# Patient Record
Sex: MALE | Race: WHITE | NOT HISPANIC OR LATINO | Employment: OTHER | URBAN - METROPOLITAN AREA
[De-identification: names, ages, dates, MRNs, and addresses within clinical notes are randomized per-mention and may not be internally consistent; named-entity substitution may affect disease eponyms.]

---

## 2018-08-11 ENCOUNTER — OFFICE VISIT (OUTPATIENT)
Dept: URGENT CARE | Facility: CLINIC | Age: 73
End: 2018-08-11
Payer: MEDICARE

## 2018-08-11 VITALS
RESPIRATION RATE: 16 BRPM | WEIGHT: 170 LBS | OXYGEN SATURATION: 98 % | HEART RATE: 66 BPM | BODY MASS INDEX: 24.34 KG/M2 | TEMPERATURE: 98 F | HEIGHT: 70 IN | DIASTOLIC BLOOD PRESSURE: 70 MMHG | SYSTOLIC BLOOD PRESSURE: 130 MMHG

## 2018-08-11 DIAGNOSIS — M79.671 PAIN OF RIGHT HEEL: Primary | ICD-10-CM

## 2018-08-11 PROCEDURE — 99203 OFFICE O/P NEW LOW 30 MIN: CPT | Mod: S$GLB,,, | Performed by: NURSE PRACTITIONER

## 2018-08-11 RX ORDER — NAPROXEN 500 MG/1
500 TABLET ORAL 2 TIMES DAILY WITH MEALS
Qty: 10 TABLET | Refills: 0 | Status: SHIPPED | OUTPATIENT
Start: 2018-08-11 | End: 2018-08-11 | Stop reason: SDUPTHER

## 2018-08-11 RX ORDER — EZETIMIBE 10 MG/1
10 TABLET ORAL DAILY
COMMUNITY

## 2018-08-11 RX ORDER — HYDROCHLOROTHIAZIDE 50 MG/1
50 TABLET ORAL DAILY
COMMUNITY
End: 2021-03-26 | Stop reason: SDUPTHER

## 2018-08-11 RX ORDER — LISINOPRIL 10 MG/1
10 TABLET ORAL DAILY
COMMUNITY
End: 2021-03-26 | Stop reason: SDUPTHER

## 2018-08-11 RX ORDER — NAPROXEN 500 MG/1
500 TABLET ORAL 2 TIMES DAILY WITH MEALS
Qty: 10 TABLET | Refills: 0 | Status: SHIPPED | OUTPATIENT
Start: 2018-08-11 | End: 2018-08-16

## 2018-08-11 RX ORDER — ZOLPIDEM TARTRATE 10 MG/1
5 TABLET ORAL NIGHTLY PRN
COMMUNITY
End: 2021-03-26 | Stop reason: ALTCHOICE

## 2018-08-11 RX ORDER — OMEPRAZOLE 20 MG/1
20 CAPSULE, DELAYED RELEASE ORAL DAILY
COMMUNITY
End: 2021-03-26 | Stop reason: ALTCHOICE

## 2018-08-11 NOTE — PATIENT INSTRUCTIONS
Orthopedic Follow up Discharge Instructions    If your condition worsens or fails to improve we recommend that you receive another evaluation at the ER immediately or contact your PCP to discuss your concerns or return here. You must understand that you've received an urgent care treatment only and that you may be released before all your medical problems are known or treated. You the patient will arrange for follouwp care as instructed.   If you were prescribed a narcotic or muscle relaxant do not drive or operate heavy machinery while taking these medications   Tylenol or ibuprofen can also be used as directed for pain unless you have an allergy to them or medical condition such as stomach ulcers, kidney or liver disease or blood thinners etc for which you should not be taking these type of medications.   If you were given a prescription NSAID here do not also take any over the counter NSAID like ibuprofen, aleve, advil, motrin etc   RICE which means rest, ice compression and elevation are helpful.   If you were given a splint wear it at all times.   If you were given crutches use them as we instructed. Do not rest your armpits on the foam pad or you risk compressing the nerves and the vessels there   Follow up with the orthopedist in 1 week if you continue with pain.   Sometimes it can take up to 1 week for stress fractures to show up on an X-ray, and may need reimaging or a CT or MRI of the affected area.      General Referral to [facility]  You were referred to [facility] to Establish Care.  Please call [phone] to schedule your appointment.    Please return here or go to the Emergency Department for any concerns or worsening of condition.  Please follow up with your primary care doctor or specialist in the next 48-72hrs as needed.    If you  smoke, please stop smoking.

## 2018-08-11 NOTE — PROGRESS NOTES
"Subjective:       Patient ID: Javier June is a 72 y.o. male.    Vitals:  height is 5' 9.5" (1.765 m) and weight is 77.1 kg (170 lb). His temperature is 97.7 °F (36.5 °C). His blood pressure is 130/70 and his pulse is 66. His respiration is 16 and oxygen saturation is 98%.     Chief Complaint: Heel Pain    Pt presents with right heel pain. He states pain noticed on yesterday morning. He states one small tender spot when he touches the heel. He doesn;t think he hit the heel on anything to have caused this. He has some trouble walking- limping. [place] pain level is 5/10.       Foot Injury    The incident occurred 2 days ago. The injury mechanism is unknown. The symptoms are aggravated by weight bearing.     Review of Systems   Constitution: Negative for chills and fever.   HENT: Negative for sore throat.    Eyes: Negative for blurred vision.   Cardiovascular: Negative for chest pain.   Respiratory: Negative for shortness of breath.    Skin: Negative for rash.   Musculoskeletal: Negative for back pain and joint pain.   Gastrointestinal: Negative for abdominal pain, diarrhea, nausea and vomiting.   Neurological: Negative for headaches.   Psychiatric/Behavioral: The patient is not nervous/anxious.        Objective:      Physical Exam   Constitutional: He is oriented to person, place, and time. Vital signs are normal. He appears well-developed and well-nourished. He is cooperative.  Non-toxic appearance. He does not have a sickly appearance. He does not appear ill. No distress.   HENT:   Head: Normocephalic and atraumatic.   Right Ear: Hearing, tympanic membrane, external ear and ear canal normal.   Left Ear: Hearing, tympanic membrane, external ear and ear canal normal.   Nose: Nose normal. No mucosal edema, rhinorrhea or nasal deformity. No epistaxis. Right sinus exhibits no maxillary sinus tenderness and no frontal sinus tenderness. Left sinus exhibits no maxillary sinus tenderness and no frontal sinus tenderness. "   Mouth/Throat: Uvula is midline, oropharynx is clear and moist and mucous membranes are normal. No trismus in the jaw. Normal dentition. No uvula swelling. No posterior oropharyngeal erythema.   Eyes: Conjunctivae, EOM and lids are normal. Pupils are equal, round, and reactive to light. Right eye exhibits no discharge. Left eye exhibits no discharge. No scleral icterus.   Sclera clear bilat   Neck: Trachea normal, normal range of motion, full passive range of motion without pain and phonation normal. Neck supple.   Cardiovascular: Normal rate, regular rhythm, S1 normal, S2 normal, normal heart sounds, intact distal pulses and normal pulses.    Pulmonary/Chest: Effort normal and breath sounds normal. No respiratory distress. He has no decreased breath sounds. He has no wheezes. He has no rhonchi. He has no rales.   Abdominal: Soft. Normal appearance and bowel sounds are normal. He exhibits no distension, no pulsatile midline mass and no mass. There is no tenderness.   Musculoskeletal: He exhibits no edema or deformity.        Right foot: There is tenderness and bony tenderness. There is normal range of motion, no swelling, normal capillary refill, no crepitus, no deformity and no laceration.        Feet:    Mild tenderness across the posterior point of the calcaneum bone.  There is no swelling, deformity or laceration noted.  See attached picture   Neurological: He is alert and oriented to person, place, and time. He exhibits normal muscle tone. Coordination normal.   Skin: Skin is warm, dry and intact. No abrasion, no laceration and no rash noted. He is not diaphoretic. No pallor.   Psychiatric: He has a normal mood and affect. His speech is normal and behavior is normal. Judgment and thought content normal. Cognition and memory are normal.   Nursing note and vitals reviewed.              Assessment:       1. Pain of right heel        Plan:       Patient declined foot xray on today stating he would either return  here or go to his PCP if no improvement in a couple of days.    Patient called back saying [facility] is closed. Asked us to change to [facility] on [address].  Order resent.     Pain of right heel  -     naproxen (NAPROSYN) 500 MG tablet; Take 1 tablet (500 mg total) by mouth 2 (two) times daily with meals. for 5 days  Dispense: 10 tablet; Refill: 0  -     Ambulatory referral to Internal Medicine      Patient Instructions                                Orthopedic Follow up Discharge Instructions    If your condition worsens or fails to improve we recommend that you receive another evaluation at the ER immediately or contact your PCP to discuss your concerns or return here. You must understand that you've received an urgent care treatment only and that you may be released before all your medical problems are known or treated. You the patient will arrange for follouwp care as instructed.   If you were prescribed a narcotic or muscle relaxant do not drive or operate heavy machinery while taking these medications   Tylenol or ibuprofen can also be used as directed for pain unless you have an allergy to them or medical condition such as stomach ulcers, kidney or liver disease or blood thinners etc for which you should not be taking these type of medications.   If you were given a prescription NSAID here do not also take any over the counter NSAID like ibuprofen, aleve, advil, motrin etc   RICE which means rest, ice compression and elevation are helpful.   If you were given a splint wear it at all times.   If you were given crutches use them as we instructed. Do not rest your armpits on the foam pad or you risk compressing the nerves and the vessels there   Follow up with the orthopedist in 1 week if you continue with pain.   Sometimes it can take up to 1 week for stress fractures to show up on an X-ray, and may need reimaging or a CT or MRI of the affected area.      General Referral to [facility]  You were referred to  [facility] to Establish Care.  Please call [phone] to schedule your appointment.    Please return here or go to the Emergency Department for any concerns or worsening of condition.  Please follow up with your primary care doctor or specialist in the next 48-72hrs as needed.    If you  smoke, please stop smoking.

## 2018-08-14 ENCOUNTER — TELEPHONE (OUTPATIENT)
Dept: URGENT CARE | Facility: CLINIC | Age: 73
End: 2018-08-14

## 2018-08-14 ENCOUNTER — CLINICAL SUPPORT (OUTPATIENT)
Dept: URGENT CARE | Facility: CLINIC | Age: 73
End: 2018-08-14
Payer: MEDICARE

## 2018-08-14 VITALS
RESPIRATION RATE: 15 BRPM | HEART RATE: 78 BPM | HEIGHT: 70 IN | OXYGEN SATURATION: 98 % | WEIGHT: 170 LBS | BODY MASS INDEX: 24.34 KG/M2 | SYSTOLIC BLOOD PRESSURE: 167 MMHG | DIASTOLIC BLOOD PRESSURE: 98 MMHG | TEMPERATURE: 99 F

## 2018-08-14 DIAGNOSIS — M77.51 BONE SPUR OF RIGHT FOOT: ICD-10-CM

## 2018-08-14 DIAGNOSIS — M79.671 RIGHT FOOT PAIN: Primary | ICD-10-CM

## 2018-08-14 PROCEDURE — 99213 OFFICE O/P EST LOW 20 MIN: CPT | Mod: S$GLB,,, | Performed by: NURSE PRACTITIONER

## 2018-08-14 RX ORDER — NAPROXEN 500 MG/1
500 TABLET ORAL 2 TIMES DAILY WITH MEALS
Qty: 20 TABLET | Refills: 0 | Status: SHIPPED | OUTPATIENT
Start: 2018-08-14 | End: 2018-08-24

## 2018-08-14 NOTE — PROGRESS NOTES
"Subjective:       Patient ID: Javier June is a 72 y.o. male.    Vitals:  height is 5' 9.5" (1.765 m) and weight is 77.1 kg (170 lb). His temperature is 99 °F (37.2 °C). His blood pressure is 167/98 (abnormal) and his pulse is 78. His respiration is 15 and oxygen saturation is 98%.     Chief Complaint: Foot Injury (right heel pain when bearing weight.)    Pt was seen Saturday for right heel pain, has been taking rx and has found improvement but still hurts, pt was offered xrays last visit but refused, pt comes back requesting xrays now. Denies trauma or injury, says it is pin point pain when he stands or walks.       Review of Systems   Constitution: Negative for chills and fever.   HENT: Negative for sore throat.    Eyes: Negative for blurred vision.   Cardiovascular: Negative for chest pain.   Respiratory: Negative for shortness of breath.    Skin: Negative for rash.   Musculoskeletal: Positive for joint pain. Negative for back pain and stiffness.   Gastrointestinal: Negative for abdominal pain, diarrhea, nausea and vomiting.   Neurological: Negative for headaches.   Psychiatric/Behavioral: The patient is not nervous/anxious.        Objective:      Physical Exam   Constitutional: He is oriented to person, place, and time. He appears well-developed and well-nourished. He is cooperative.  Non-toxic appearance. He does not appear ill. No distress.   HENT:   Head: Normocephalic and atraumatic.   Right Ear: Hearing, tympanic membrane, external ear and ear canal normal.   Left Ear: Hearing, tympanic membrane, external ear and ear canal normal.   Nose: Nose normal. No mucosal edema, rhinorrhea or nasal deformity. No epistaxis. Right sinus exhibits no maxillary sinus tenderness and no frontal sinus tenderness. Left sinus exhibits no maxillary sinus tenderness and no frontal sinus tenderness.   Mouth/Throat: Uvula is midline, oropharynx is clear and moist and mucous membranes are normal. No trismus in the jaw. Normal " dentition. No uvula swelling. No posterior oropharyngeal erythema.   Eyes: Conjunctivae and lids are normal. Right eye exhibits no discharge. Left eye exhibits no discharge. No scleral icterus.   Sclera clear bilat   Neck: Trachea normal, normal range of motion, full passive range of motion without pain and phonation normal. Neck supple.   Cardiovascular: Normal rate, regular rhythm, normal heart sounds, intact distal pulses and normal pulses.   Pulmonary/Chest: Effort normal and breath sounds normal. No respiratory distress.   Abdominal: Soft. Normal appearance and bowel sounds are normal. He exhibits no distension, no pulsatile midline mass and no mass. There is no tenderness.   Musculoskeletal: Normal range of motion. He exhibits no edema or deformity.        Right foot: There is tenderness and bony tenderness.        Feet:    Point tenderness to right heel   Neurological: He is alert and oriented to person, place, and time. He exhibits normal muscle tone. Coordination normal.   Skin: Skin is warm, dry and intact. He is not diaphoretic. No pallor.   Psychiatric: He has a normal mood and affect. His speech is normal and behavior is normal. Judgment and thought content normal. Cognition and memory are normal.   Nursing note and vitals reviewed.      Assessment:       1. Right foot pain        Plan:         Right foot pain  -     X-Ray Foot Complete Right; Future; Expected date: 08/14/2018

## 2018-08-14 NOTE — PATIENT INSTRUCTIONS

## 2018-08-20 ENCOUNTER — TELEPHONE (OUTPATIENT)
Dept: URGENT CARE | Facility: CLINIC | Age: 73
End: 2018-08-20

## 2018-08-21 NOTE — TELEPHONE ENCOUNTER
patient called back, says he is doing great and has a follow up appointment with his doctor later this week

## 2018-08-29 ENCOUNTER — OFFICE VISIT (OUTPATIENT)
Dept: URGENT CARE | Facility: CLINIC | Age: 73
End: 2018-08-29
Payer: MEDICARE

## 2018-08-29 VITALS
HEART RATE: 76 BPM | WEIGHT: 170 LBS | DIASTOLIC BLOOD PRESSURE: 82 MMHG | HEIGHT: 70 IN | SYSTOLIC BLOOD PRESSURE: 140 MMHG | TEMPERATURE: 98 F | RESPIRATION RATE: 18 BRPM | OXYGEN SATURATION: 98 % | BODY MASS INDEX: 24.34 KG/M2

## 2018-08-29 DIAGNOSIS — S80.212A ABRASION OF LEFT KNEE, INITIAL ENCOUNTER: ICD-10-CM

## 2018-08-29 DIAGNOSIS — S89.92XA LEFT KNEE INJURY, INITIAL ENCOUNTER: Primary | ICD-10-CM

## 2018-08-29 DIAGNOSIS — L08.9 LOCALIZED BACTERIAL SKIN INFECTION: ICD-10-CM

## 2018-08-29 DIAGNOSIS — B96.89 LOCALIZED BACTERIAL SKIN INFECTION: ICD-10-CM

## 2018-08-29 DIAGNOSIS — S80.02XA CONTUSION OF LEFT KNEE, INITIAL ENCOUNTER: ICD-10-CM

## 2018-08-29 PROCEDURE — 99214 OFFICE O/P EST MOD 30 MIN: CPT | Mod: S$GLB,,, | Performed by: NURSE PRACTITIONER

## 2018-08-29 RX ORDER — CEPHALEXIN 500 MG/1
500 TABLET ORAL 3 TIMES DAILY
Qty: 30 TABLET | Refills: 0 | Status: SHIPPED | OUTPATIENT
Start: 2018-08-29 | End: 2018-09-08

## 2018-08-29 RX ORDER — NAPROXEN 500 MG/1
500 TABLET ORAL 2 TIMES DAILY WITH MEALS
Qty: 10 TABLET | Refills: 0 | Status: SHIPPED | OUTPATIENT
Start: 2018-08-29 | End: 2018-09-03

## 2018-08-29 RX ORDER — CEPHALEXIN 500 MG/1
500 TABLET ORAL 3 TIMES DAILY
Qty: 15 TABLET | Refills: 0 | Status: SHIPPED | OUTPATIENT
Start: 2018-08-29 | End: 2018-08-29

## 2018-08-29 RX ORDER — MUPIROCIN 20 MG/G
OINTMENT TOPICAL
Status: COMPLETED | OUTPATIENT
Start: 2018-08-29 | End: 2018-08-29

## 2018-08-29 RX ORDER — MUPIROCIN 20 MG/G
OINTMENT TOPICAL
Qty: 22 G | Refills: 1 | Status: SHIPPED | OUTPATIENT
Start: 2018-08-29 | End: 2021-03-26 | Stop reason: ALTCHOICE

## 2018-08-29 RX ADMIN — MUPIROCIN: 20 OINTMENT TOPICAL at 09:08

## 2018-08-29 NOTE — PROGRESS NOTES
"Subjective:       Patient ID: Javier June is a 72 y.o. male.    Vitals:  height is 5' 9.5" (1.765 m) and weight is 77.1 kg (170 lb). His temperature is 97.6 °F (36.4 °C). His blood pressure is 140/82 (abnormal) and his pulse is 76. His respiration is 18 and oxygen saturation is 98%.     Chief Complaint: Abrasion    Patient in for left knee pain and abrasion. Patient fell on the 20th while he was taking naproxen for his initial foot pain and after he ran out the swelling started again. Patient said he doesn't have any pain in the knee except when he stands up. Does not hurt to walk or put pressure on the leg. Patient has been putting neosporin on the knee. Not sure if his tetanus is utd. Patient said his foot feels better since the last time he was here. Patient is from here. Patient having some redness and swelling. The wound is warm to touch.       Knee Pain    The incident occurred more than 1 week ago. The pain is present in the left knee. Pertinent negatives include no inability to bear weight, numbness or tingling.     Review of Systems   Constitution: Negative for chills and fever.   HENT: Negative for sore throat.    Eyes: Negative for blurred vision.   Cardiovascular: Negative for chest pain.   Respiratory: Negative for shortness of breath.    Skin: Negative for rash.   Musculoskeletal: Negative for back pain and joint pain.   Gastrointestinal: Negative for abdominal pain, diarrhea, nausea and vomiting.   Neurological: Negative for headaches, numbness and tingling.   Psychiatric/Behavioral: The patient is not nervous/anxious.        Objective:      Physical Exam   Constitutional: He is oriented to person, place, and time. Vital signs are normal. He appears well-developed and well-nourished.  Non-toxic appearance. He does not have a sickly appearance. He does not appear ill. No distress.   HENT:   Head: Normocephalic and atraumatic. Head is without abrasion, without contusion and without laceration.   Right " Ear: External ear normal.   Left Ear: External ear normal.   Nose: Nose normal.   Mouth/Throat: Oropharynx is clear and moist.   Eyes: Conjunctivae, EOM and lids are normal. Pupils are equal, round, and reactive to light.   Neck: Trachea normal, full passive range of motion without pain and phonation normal. Neck supple.   Cardiovascular: Normal rate, regular rhythm, S1 normal, S2 normal, normal heart sounds and normal pulses.   Pulmonary/Chest: Effort normal and breath sounds normal. No stridor. No respiratory distress. He has no decreased breath sounds. He has no wheezes. He has no rhonchi. He has no rales.   Musculoskeletal: Normal range of motion.        Left knee: He exhibits swelling. He exhibits normal range of motion, no effusion, no ecchymosis, no deformity, no laceration, no erythema, normal alignment, no LCL laxity, normal patellar mobility, no bony tenderness, normal meniscus and no MCL laxity. Tenderness (around abrasion site.) found. No medial joint line, no lateral joint line, no MCL, no LCL and no patellar tendon tenderness noted.   Neurological: He is alert and oriented to person, place, and time.   Skin: Skin is warm and dry. Capillary refill takes less than 2 seconds. Abrasion noted. No bruising, no burn, no ecchymosis, no laceration, no lesion and no rash noted. No erythema.        Left knee healed abrasion with honey crusted covering and mild eschar to the top border of abrasion. There is also  redness and swelling surrounding the abrasion.  See attached picture   Psychiatric: He has a normal mood and affect. His speech is normal and behavior is normal. Judgment and thought content normal. Cognition and memory are normal.   Nursing note and vitals reviewed.          Type of Interpretation: Radiology Verbal Report.  Radiology Procedure Done: Left Knee.  Interpretation: XR KNEE 3 VIEW LEFT   Status: Final result  [facility] Results Release     [facility] Status: Declined   PACS Images      Show images  for XR KNEE 3 VIEW LEFT  XR KNEE 3 VIEW LEFT   Order: [id number]   Status:  Final result   Visible to patient:  No (Not Released) Next appt:  None Dx:  Contusion of left knee, initial encou...   Details       Reading Physician Reading Date Result Priority  Dre Lawler MD 8/29/2018     Narrative    EXAMINATION:  XR KNEE 3 VIEW LEFT    CLINICAL HISTORY:  Contusion of left knee, initial encounter    TECHNIQUE:  AP, lateral, and Merchant views of the left knee were performed.    COMPARISON:  None    FINDINGS:  Prepatellar soft tissue swelling tiny remote calcification medial femoral epiphysis, mild tricompartment DJD change.  Tiny remote calcification superior margin patella at extensor tendon insertion site.    Impression      Prepatellar soft tissue contusion.  No fracture, dislocation or joint effusion.      Electronically signed by: Dre Lawler MD  Date: 08/29/2018  Time: 09:43              Assessment:       1. Left knee injury, initial encounter    2. Abrasion of left knee, initial encounter    3. Localized bacterial skin infection    4. Contusion of left knee, initial encounter        Plan:         - Ace wrap to the left knee.    Left knee injury, initial encounter  -     XR KNEE 3 VIEW LEFT; Future; Expected date: 08/29/2018    Abrasion of left knee, initial encounter  -     mupirocin (BACTROBAN) 2 % ointment; Apply to affected area 3 times daily  Dispense: 22 g; Refill: 1  -     mupirocin 2 % ointment; Apply topically one time.    Localized bacterial skin infection  -     cephalexin (KEFTAB) 500 mg tablet; Take 1 tablet (500 mg total) by mouth 3 (three) times daily. for 10 days  Dispense: 15 tablet; Refill: 0    Contusion of left knee, initial encounter  -     naproxen (NAPROSYN) 500 MG tablet; Take 1 tablet (500 mg total) by mouth 2 (two) times daily with meals. for 5 days  Dispense: 10 tablet; Refill: 0          Patient Instructions                                Orthopedic Follow up Discharge  Instructions    If your condition worsens or fails to improve we recommend that you receive another evaluation at the ER immediately or contact your PCP to discuss your concerns or return here. You must understand that you've received an urgent care treatment only and that you may be released before all your medical problems are known or treated. You the patient will arrange for follouwp care as instructed.   If you were prescribed a narcotic or muscle relaxant do not drive or operate heavy machinery while taking these medications   Tylenol or ibuprofen can also be used as directed for pain unless you have an allergy to them or medical condition such as stomach ulcers, kidney or liver disease or blood thinners etc for which you should not be taking these type of medications.   If you were given a prescription NSAID here do not also take any over the counter NSAID like ibuprofen, aleve, advil, motrin etc   RICE which means rest, ice compression and elevation are helpful.   If you have Low Back Pain and develop bowel or bladder symptoms or increase pain going down your legs go to the ER immediately.   If you were given a splint wear it at all times.   If you were given crutches use them as we instructed. Do not rest your armpits on the foam pad or you risk compressing the nerves and the vessels there   Follow up with the orthopedist in 1 week if you continue with pain.   Sometimes it can take up to 1 week for stress fractures to show up on an X-ray, and may need reimaging or a CT or MRI of the affected area.      Contusions (bruising)  -  Cool compresses to the affected area 2-3 times a day for the first 48-72hrs.  -  Follow up with your PCP in the next 48-72hrs if no improvement in symptoms.  -  Follow up in the ER for any of the following symptoms:  · Pain, bruising, or swelling worsens  · Weakness, numbness or inability to bear weight in the extremity      Skin Infection  If your condition worsens or fails to  improve we recommend that you receive another evaluation at the ER immediately or contact your PCP to discuss your concerns or return here. You must understand that you've received an urgent care treatment only and that you may be released before all your medical problems are known or treated. You the patient will arrange for follouwp care as instructed.   Cool compressed to affected areas at least 2-3 times a day.  Avoid picking or manipulating the wound. Clean the wound twice a day and put the antibiotic ointment on it.   You should seek ER treatment if fever, increase pain, swelling, red streaks from affected area or other signs of increasing infection.   If you were prescribed antibiotics, please take them to completion  Tylenol or ibuprofen can also be used as directed for pain unless you have an allergy to them or medical condition such as stomach ulcers, kidney or liver disease or blood thinners etc for which you should not be taking these type of medications.   If not allergic, please take over the counter Tylenol (Acetaminophen) and/or Motrin (Ibuprofen) as directed for control of pain and/or fever.

## 2018-08-29 NOTE — PATIENT INSTRUCTIONS
Orthopedic Follow up Discharge Instructions    If your condition worsens or fails to improve we recommend that you receive another evaluation at the ER immediately or contact your PCP to discuss your concerns or return here. You must understand that you've received an urgent care treatment only and that you may be released before all your medical problems are known or treated. You the patient will arrange for follouwp care as instructed.   If you were prescribed a narcotic or muscle relaxant do not drive or operate heavy machinery while taking these medications   Tylenol or ibuprofen can also be used as directed for pain unless you have an allergy to them or medical condition such as stomach ulcers, kidney or liver disease or blood thinners etc for which you should not be taking these type of medications.   If you were given a prescription NSAID here do not also take any over the counter NSAID like ibuprofen, aleve, advil, motrin etc   RICE which means rest, ice compression and elevation are helpful.   If you have Low Back Pain and develop bowel or bladder symptoms or increase pain going down your legs go to the ER immediately.   If you were given a splint wear it at all times.   If you were given crutches use them as we instructed. Do not rest your armpits on the foam pad or you risk compressing the nerves and the vessels there   Follow up with the orthopedist in 1 week if you continue with pain.   Sometimes it can take up to 1 week for stress fractures to show up on an X-ray, and may need reimaging or a CT or MRI of the affected area.      Contusions (bruising)  -  Cool compresses to the affected area 2-3 times a day for the first 48-72hrs.  -  Follow up with your PCP in the next 48-72hrs if no improvement in symptoms.  -  Follow up in the ER for any of the following symptoms:  · Pain, bruising, or swelling worsens  · Weakness, numbness or inability to bear weight in the  extremity      Skin Infection  If your condition worsens or fails to improve we recommend that you receive another evaluation at the ER immediately or contact your PCP to discuss your concerns or return here. You must understand that you've received an urgent care treatment only and that you may be released before all your medical problems are known or treated. You the patient will arrange for follouwp care as instructed.   Cool compressed to affected areas at least 2-3 times a day.  Avoid picking or manipulating the wound. Clean the wound twice a day and put the antibiotic ointment on it.   You should seek ER treatment if fever, increase pain, swelling, red streaks from affected area or other signs of increasing infection.   If you were prescribed antibiotics, please take them to completion  Tylenol or ibuprofen can also be used as directed for pain unless you have an allergy to them or medical condition such as stomach ulcers, kidney or liver disease or blood thinners etc for which you should not be taking these type of medications.   If not allergic, please take over the counter Tylenol (Acetaminophen) and/or Motrin (Ibuprofen) as directed for control of pain and/or fever.

## 2018-08-31 ENCOUNTER — TELEPHONE (OUTPATIENT)
Dept: URGENT CARE | Facility: CLINIC | Age: 73
End: 2018-08-31

## 2018-12-12 ENCOUNTER — OFFICE VISIT (OUTPATIENT)
Dept: URGENT CARE | Facility: CLINIC | Age: 73
End: 2018-12-12
Payer: MEDICARE

## 2018-12-12 VITALS
RESPIRATION RATE: 18 BRPM | TEMPERATURE: 98 F | SYSTOLIC BLOOD PRESSURE: 176 MMHG | DIASTOLIC BLOOD PRESSURE: 92 MMHG | HEART RATE: 91 BPM | OXYGEN SATURATION: 98 %

## 2018-12-12 DIAGNOSIS — S81.012A LACERATION OF LEFT KNEE, INITIAL ENCOUNTER: ICD-10-CM

## 2018-12-12 DIAGNOSIS — S60.222A CONTUSION OF LEFT HAND, INITIAL ENCOUNTER: ICD-10-CM
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
## 2018-12-12 DIAGNOSIS — S80.02XA CONTUSION OF LEFT KNEE, INITIAL ENCOUNTER: ICD-10-CM

## 2018-12-12 PROCEDURE — 12031 INTMD RPR S/A/T/EXT 2.5 CM/<: CPT | Mod: S$GLB,,, | Performed by: NURSE PRACTITIONER

## 2018-12-12 PROCEDURE — 99214 OFFICE O/P EST MOD 30 MIN: CPT | Mod: 25,S$GLB,, | Performed by: NURSE PRACTITIONER

## 2018-12-12 PROCEDURE — 73562 X-RAY EXAM OF KNEE 3: CPT | Mod: FY,LT,S$GLB, | Performed by: RADIOLOGY

## 2018-12-12 PROCEDURE — 73130 X-RAY EXAM OF HAND: CPT | Mod: FY,LT,S$GLB, | Performed by: RADIOLOGY

## 2018-12-12 RX ORDER — MUPIROCIN 20 MG/G
OINTMENT TOPICAL
Qty: 22 G | Refills: 1 | Status: SHIPPED | OUTPATIENT
Start: 2018-12-12 | End: 2021-03-26 | Stop reason: ALTCHOICE

## 2018-12-12 RX ORDER — NAPROXEN 500 MG/1
500 TABLET ORAL 2 TIMES DAILY WITH MEALS
Qty: 20 TABLET | Refills: 0 | Status: SHIPPED | OUTPATIENT
Start: 2018-12-12 | End: 2018-12-22

## 2018-12-12 RX ORDER — DOXYCYCLINE 100 MG/1
100 CAPSULE ORAL 2 TIMES DAILY
Qty: 10 CAPSULE | Refills: 0 | Status: SHIPPED | OUTPATIENT
Start: 2018-12-12 | End: 2018-12-17

## 2018-12-12 RX ORDER — HYDROCODONE BITARTRATE AND ACETAMINOPHEN 5; 325 MG/1; MG/1
1 TABLET ORAL EVERY 6 HOURS PRN
Qty: 10 TABLET | Refills: 0 | Status: SHIPPED | OUTPATIENT
Start: 2018-12-12 | End: 2021-03-26 | Stop reason: ALTCHOICE

## 2018-12-12 RX ORDER — MUPIROCIN 20 MG/G
OINTMENT TOPICAL
Status: COMPLETED | OUTPATIENT
Start: 2018-12-12 | End: 2018-12-12

## 2018-12-12 RX ADMIN — MUPIROCIN: 20 OINTMENT TOPICAL at 03:12

## 2018-12-12 NOTE — PROCEDURES
Laceration Repair  Date/Time: 2018 3:18 PM  Performed by: Izaiah Cutler NP  Authorized by: Izaiah Cutler NP   Consent Done: Yes  Consent: Verbal consent obtained.  Risks and benefits: risks, benefits and alternatives were discussed  Consent given by: patient  Patient identity confirmed:  and name  Body area: lower extremity  Location details: left knee  Laceration length: 2 cm  Foreign bodies: no foreign bodies  Tendon involvement: none  Nerve involvement: none  Vascular damage: no  Patient sedated: no  Preparation: Patient was prepped and draped in the usual sterile fashion.  Irrigation solution: saline  Irrigation method: syringe  Amount of cleaning: extensive  Debridement: moderate  Degree of undermining: none  Dressing: antibiotic ointment, petrolatum gauze, tube gauze and Xeroform gauze  Patient tolerance: Patient tolerated the procedure well with no immediate complications

## 2018-12-12 NOTE — PROGRESS NOTES
Subjective:       Patient ID: Javier June is a 73 y.o. male.    Vitals:  temperature is 98.1 °F (36.7 °C). His blood pressure is 176/92 (abnormal) and his pulse is 91. His respiration is 18 and oxygen saturation is 98%.     Chief Complaint: Knee Pain (left) and Hand Pain (left thumb)    Pt is local, about 12:30pm today he tripped and fell on the sidewalk injurying both hands and left knee. Pt concerned because he hurt this same knee last visit. Pt is utd on tetanus and all other vaccines.       Knee Pain    The incident occurred 1 to 3 hours ago. The incident occurred in the street. The injury mechanism was a fall. The pain is present in the left knee. The quality of the pain is described as shooting, aching and burning. The pain is at a severity of 4/10. The pain is mild. The pain has been constant since onset. Pertinent negatives include no inability to bear weight, numbness or tingling. The symptoms are aggravated by palpation. He has tried nothing for the symptoms.   Hand Pain    The incident occurred 1 to 3 hours ago. The incident occurred in the street. The injury mechanism was a fall. The pain is present in the right fingers and left fingers. The quality of the pain is described as aching. Pertinent negatives include no numbness or tingling.       Constitution: Negative for fatigue.   HENT: Negative for facial swelling and facial trauma.    Neck: Negative for neck stiffness.   Cardiovascular: Negative for chest trauma.   Eyes: Negative for eye trauma, double vision and blurred vision.   Gastrointestinal: Negative for abdominal trauma, abdominal pain and rectal bleeding.   Genitourinary: Negative for hematuria, genital trauma and pelvic pain.   Musculoskeletal: Positive for pain, trauma, joint swelling and pain with walking. Negative for abnormal ROM of joint.   Skin: Positive for wound, abrasion and bruising. Negative for color change and laceration.   Neurological: Negative for dizziness, history of vertigo,  light-headedness, coordination disturbances, altered mental status, loss of consciousness and numbness.   Hematologic/Lymphatic: Negative for history of bleeding disorder.   Psychiatric/Behavioral: Negative for altered mental status.       Objective:      Physical Exam   Constitutional: He is oriented to person, place, and time. He appears well-developed and well-nourished. He is cooperative.  Non-toxic appearance. He does not appear ill. No distress.   HENT:   Head: Normocephalic and atraumatic. Head is without abrasion, without contusion and without laceration.   Right Ear: Hearing, tympanic membrane, external ear and ear canal normal. No hemotympanum.   Left Ear: Hearing, tympanic membrane, external ear and ear canal normal. No hemotympanum.   Nose: Nose normal. No mucosal edema, rhinorrhea or nasal deformity. No epistaxis. Right sinus exhibits no maxillary sinus tenderness and no frontal sinus tenderness. Left sinus exhibits no maxillary sinus tenderness and no frontal sinus tenderness.   Mouth/Throat: Uvula is midline, oropharynx is clear and moist and mucous membranes are normal. No trismus in the jaw. Normal dentition. No uvula swelling. No posterior oropharyngeal erythema.   Eyes: Conjunctivae, EOM and lids are normal. Pupils are equal, round, and reactive to light. Right eye exhibits no discharge. Left eye exhibits no discharge. No scleral icterus.   Sclera clear bilat   Neck: Trachea normal, normal range of motion, full passive range of motion without pain and phonation normal. Neck supple. No spinous process tenderness and no muscular tenderness present. No neck rigidity. No tracheal deviation present.   Cardiovascular: Normal rate, regular rhythm, normal heart sounds, intact distal pulses and normal pulses.   Pulmonary/Chest: Effort normal and breath sounds normal. No respiratory distress.   Abdominal: Soft. Normal appearance and bowel sounds are normal. He exhibits no distension, no pulsatile midline  mass and no mass. There is no tenderness.   Musculoskeletal: Normal range of motion. He exhibits no edema or deformity.        Left knee: He exhibits swelling, ecchymosis, laceration, erythema and bony tenderness. Tenderness found.        Left hand: He exhibits tenderness, bony tenderness and swelling.        Hands:       Legs:  Neurological: He is alert and oriented to person, place, and time. He has normal strength. No cranial nerve deficit or sensory deficit. He exhibits normal muscle tone. He displays no seizure activity. Coordination normal. GCS eye subscore is 4. GCS verbal subscore is 5. GCS motor subscore is 6.   Skin: Skin is warm, dry and intact. Capillary refill takes less than 2 seconds. No abrasion, no bruising, no burn, no ecchymosis and no laceration noted. He is not diaphoretic. No pallor.   Psychiatric: He has a normal mood and affect. His speech is normal and behavior is normal. Judgment and thought content normal. Cognition and memory are normal.   Nursing note and vitals reviewed.      Assessment:       1. Fall, initial encounter    2. Laceration of left knee, initial encounter    3. Contusion of left knee, initial encounter    4. Contusion of left hand, initial encounter        Plan:         Fall, initial encounter  -     XR KNEE 3 VIEW LEFT; Future; Expected date: 12/12/2018  -     XR HAND COMPLETE 3 VIEW LEFT; Future; Expected date: 12/12/2018  -     Ambulatory referral to Orthopedics  -     naproxen (NAPROSYN) 500 MG tablet; Take 1 tablet (500 mg total) by mouth 2 (two) times daily with meals. for 10 days  Dispense: 20 tablet; Refill: 0  -     HYDROcodone-acetaminophen (NORCO) 5-325 mg per tablet; Take 1 tablet by mouth every 6 (six) hours as needed for Pain.  Dispense: 10 tablet; Refill: 0  -     mupirocin 2 % ointment  -     mupirocin (BACTROBAN) 2 % ointment; Apply to affected area 2 times daily  Dispense: 22 g; Refill: 1  -     doxycycline (VIBRAMYCIN) 100 MG Cap; Take 1 capsule (100 mg  total) by mouth 2 (two) times daily. for 5 days  Dispense: 10 capsule; Refill: 0  -     Laceration Repair    Laceration of left knee, initial encounter  -     Laceration Repair    Contusion of left knee, initial encounter    Contusion of left hand, initial encounter

## 2020-01-15 ENCOUNTER — OFFICE VISIT (OUTPATIENT)
Dept: URGENT CARE | Facility: CLINIC | Age: 75
End: 2020-01-15
Payer: MEDICARE

## 2020-01-15 VITALS
DIASTOLIC BLOOD PRESSURE: 73 MMHG | HEART RATE: 93 BPM | OXYGEN SATURATION: 97 % | WEIGHT: 173 LBS | SYSTOLIC BLOOD PRESSURE: 137 MMHG | BODY MASS INDEX: 24.77 KG/M2 | RESPIRATION RATE: 17 BRPM | HEIGHT: 70 IN | TEMPERATURE: 99 F

## 2020-01-15 DIAGNOSIS — S01.302A OPEN WOUND OF LEFT EAR, UNSPECIFIED OPEN WOUND TYPE, INITIAL ENCOUNTER: ICD-10-CM

## 2020-01-15 DIAGNOSIS — L98.9 SKIN LESION: Primary | ICD-10-CM

## 2020-01-15 PROCEDURE — 99214 OFFICE O/P EST MOD 30 MIN: CPT | Mod: S$GLB,,, | Performed by: EMERGENCY MEDICINE

## 2020-01-15 PROCEDURE — 99214 PR OFFICE/OUTPT VISIT, EST, LEVL IV, 30-39 MIN: ICD-10-PCS | Mod: S$GLB,,, | Performed by: EMERGENCY MEDICINE

## 2020-01-15 RX ORDER — MUPIROCIN 20 MG/G
OINTMENT TOPICAL
Qty: 22 G | Refills: 1 | Status: SHIPPED | OUTPATIENT
Start: 2020-01-15 | End: 2021-03-26 | Stop reason: ALTCHOICE

## 2020-01-15 RX ORDER — LISINOPRIL 10 MG/1
1 TABLET ORAL
COMMUNITY
Start: 2018-02-23

## 2020-01-15 RX ORDER — HYDROCHLOROTHIAZIDE 50 MG/1
1 TABLET ORAL
COMMUNITY
Start: 2018-02-23

## 2020-01-15 NOTE — PROGRESS NOTES
"Subjective:       Patient ID: Javier June is a 74 y.o. male.    Vitals:  height is 5' 9.5" (1.765 m) and weight is 78.5 kg (173 lb). His temperature is 98.9 °F (37.2 °C). His blood pressure is 137/73 and his pulse is 93. His respiration is 17 and oxygen saturation is 97%.     Chief Complaint: No chief complaint on file.    Pt states 3 weeks ago he noticed lesion on top of his left earlobe. Pt does not remember anything biting him, he also denies any pain to ear and appearance is black. It has not been healing over time, and does not hurt. It is raised and has central necrotic area. He went to [facility] and was told to come get it checked out. He had a nose lesion in past but per patient it was benign. He is aware of need for biopsy.    Other   This is a new problem. The current episode started 1 to 4 weeks ago. The problem has been unchanged. Nothing aggravates the symptoms. He has tried nothing for the symptoms. The treatment provided no relief.       Skin: Positive for lesion. Negative for erythema.       Objective:      Physical Exam   Constitutional: He is oriented to person, place, and time. He appears well-developed and well-nourished.   HENT:   Head: Normocephalic and atraumatic. Head is without abrasion, without contusion and without laceration.   Right Ear: External ear normal.   Nose: Nose normal.   Mouth/Throat: Oropharynx is clear and moist and mucous membranes are normal.   Eyes: Pupils are equal, round, and reactive to light. Conjunctivae, EOM and lids are normal.   Neck: Trachea normal, full passive range of motion without pain and phonation normal. Neck supple.   Cardiovascular: Normal rate, regular rhythm and normal heart sounds.   Pulmonary/Chest: Effort normal and breath sounds normal. No stridor. No respiratory distress.   Musculoskeletal: Normal range of motion.   Neurological: He is alert and oriented to person, place, and time.   Skin: Skin is warm, dry, intact and no rash. Capillary refill takes " less than 2 seconds.   1 CM PEARLY EXTERIOR WITH SCABBED OVER/NECROTIC CENTER, SUPERIOR ASPECT OF THE LEFT EARLOBE, NO EXUDATE, NO CELLULITIS, NO BLEEDING. IS RAISED FROM THE LOBE ABOUT 3 MM. CONCERN FOR BCC AND SCC AMONGST OTHER DISCUSSED WITH PATIENT AND NEED FOR BIOPSY abrasion, burn, bruising, erythema and ecchymosis  Psychiatric: He has a normal mood and affect. His speech is normal. Cognition and memory are normal.   Nursing note and vitals reviewed.        Assessment:       1. Skin lesion    2. Open wound of left ear, unspecified open wound type, initial encounter        Plan:         Skin lesion  Comments:  needs biopsy  Orders:  -     Ambulatory referral to Dermatology    Open wound of left ear, unspecified open wound type, initial encounter  -     Ambulatory referral to Dermatology    Other orders  -     mupirocin (BACTROBAN) 2 % ointment; Apply to affected area 3 times daily  Dispense: 22 g; Refill: 1         Patient Instructions   THERE IS CONCERN FOR THE LESION OF THE LEFT EARLOBE REQUIRING BIOPSY.  CALL DR LOWE TO SEE IF HIS DERMATOLOGIST CAN SEE YOU ASAP FOR EVALUATION AND BIOPSY.  I HAVE ALSO SENT A REFERRAL TO DERMATOLOGY THROUGH OUR SYSTEM  OK TO WASH WITH ANTIBACTERIAL SOAP AND WATER  BACTROBAN OINTMENT.      THIS EAR LESION MAY BE BENIGN OR MALIGNANT, HOWEVER DEFINITELY NEEDS BIOPSY WITH DERMATOLOGY ASAP.    Types of Skin Cancer  Skin cancer is a serious disease that can affect anyone. It is the most common form of cancer in the [place] If caught early, skin cancer can often be treated with success. But in some cases, it is life-threatening. To play it safe, talk with your healthcare provider about doing monthly skin checkups. If you see any changes in your skin, contact your doctor right away.   Basal cell cancer is the most common skin cancer. Areas of cancer (lesions) often appear on the face, ears, neck, trunk, or arms. Varying in color, these lesions may be waxy, pearly, scaly, or scarlike.  Tiny blood vessels may be seen through the lesions surface. These lesions sometime bleed easily and might not heal well.  Melanoma is less common, but much more dangerous type of skin cancer. This is because it is more likely to grow and spread than basal or squamous cell cancers.. It is often not easy to tell where a melanoma lesions borders are. It is often brown or black, but it may be mixed in color. The shape and size of melanoma lesions tend to differ from one side to the other.  Squamous cell cancer is also a common type of skin cancer. Lesions often form on the face, ears, neck, hands, or arms. The lesions are firm, red bumps or flat, scaly, crusty growths.  Bergs disease is an early stage of squamous cell cancer. The lesions are usually  red, crusty, scaly growths with well-defined borders.  There are other types of skin cancer as well. These include Merkel cell cancer and skin (cutaneous) lymphomas, but these cancers are rare.  A precancerous skin change  Actinic keratosis is not skin cancer. It is a common, precancerous skin change that can turn into a squamous cell skin cancer if left untreated over a long period of time. Actinic keratosis lesions tend to appear on sun-exposed parts of the body. They can be pink, reddish-brown, or skin-colored. These lesions are most often raised, scaly, and rough, like sandpaper. In some cases, actinic keratosis lesions are painful. Getting early treatment for actinic keratoses almost always cures the lesions.  Date Last Reviewed: 1/1/2017  © 8023-6685 [employer]. [address] [place] [address], [place] [address]. All rights reserved. This information is not intended as a substitute for professional medical care. Always follow your healthcare professional's instructions.

## 2020-01-15 NOTE — PATIENT INSTRUCTIONS
THERE IS CONCERN FOR THE LESION OF THE LEFT EARLOBE REQUIRING BIOPSY.  CALL DR LOWE TO SEE IF HIS DERMATOLOGIST CAN SEE YOU ASAP FOR EVALUATION AND BIOPSY.  I HAVE ALSO SENT A REFERRAL TO DERMATOLOGY THROUGH OUR SYSTEM  OK TO WASH WITH ANTIBACTERIAL SOAP AND WATER  BACTROBAN OINTMENT.      THIS EAR LESION MAY BE BENIGN OR MALIGNANT, HOWEVER DEFINITELY NEEDS BIOPSY WITH DERMATOLOGY ASAP.    Types of Skin Cancer  Skin cancer is a serious disease that can affect anyone. It is the most common form of cancer in the [place] If caught early, skin cancer can often be treated with success. But in some cases, it is life-threatening. To play it safe, talk with your healthcare provider about doing monthly skin checkups. If you see any changes in your skin, contact your doctor right away.   Basal cell cancer is the most common skin cancer. Areas of cancer (lesions) often appear on the face, ears, neck, trunk, or arms. Varying in color, these lesions may be waxy, pearly, scaly, or scarlike. Tiny blood vessels may be seen through the lesions surface. These lesions sometime bleed easily and might not heal well.  Melanoma is less common, but much more dangerous type of skin cancer. This is because it is more likely to grow and spread than basal or squamous cell cancers.. It is often not easy to tell where a melanoma lesions borders are. It is often brown or black, but it may be mixed in color. The shape and size of melanoma lesions tend to differ from one side to the other.  Squamous cell cancer is also a common type of skin cancer. Lesions often form on the face, ears, neck, hands, or arms. The lesions are firm, red bumps or flat, scaly, crusty growths.  Bergs disease is an early stage of squamous cell cancer. The lesions are usually  red, crusty, scaly growths with well-defined borders.  There are other types of skin cancer as well. These include Merkel cell cancer and skin (cutaneous) lymphomas, but these cancers are rare.  A  precancerous skin change  Actinic keratosis is not skin cancer. It is a common, precancerous skin change that can turn into a squamous cell skin cancer if left untreated over a long period of time. Actinic keratosis lesions tend to appear on sun-exposed parts of the body. They can be pink, reddish-brown, or skin-colored. These lesions are most often raised, scaly, and rough, like sandpaper. In some cases, actinic keratosis lesions are painful. Getting early treatment for actinic keratoses almost always cures the lesions.  Date Last Reviewed: 1/1/2017  © 1500-6107 [employer]. [address] [place] [address], [place] [address]. All rights reserved. This information is not intended as a substitute for professional medical care. Always follow your healthcare professional's instructions.

## 2020-02-06 ENCOUNTER — TELEPHONE (OUTPATIENT)
Dept: DERMATOLOGY | Facility: CLINIC | Age: 75
End: 2020-02-06

## 2020-02-20 ENCOUNTER — OFFICE VISIT (OUTPATIENT)
Dept: URGENT CARE | Facility: CLINIC | Age: 75
End: 2020-02-20
Payer: MEDICARE

## 2020-02-20 VITALS
RESPIRATION RATE: 16 BRPM | BODY MASS INDEX: 24.77 KG/M2 | OXYGEN SATURATION: 100 % | WEIGHT: 173 LBS | HEART RATE: 64 BPM | DIASTOLIC BLOOD PRESSURE: 72 MMHG | TEMPERATURE: 98 F | HEIGHT: 70 IN | SYSTOLIC BLOOD PRESSURE: 104 MMHG

## 2020-02-20 DIAGNOSIS — Z48.89 ENCOUNTER FOR POSTOPERATIVE WOUND CHECK: Primary | ICD-10-CM

## 2020-02-20 DIAGNOSIS — Z48.01 ENCOUNTER FOR SURGICAL WOUND DRESSING CHANGE: ICD-10-CM

## 2020-02-20 PROCEDURE — 99214 PR OFFICE/OUTPT VISIT, EST, LEVL IV, 30-39 MIN: ICD-10-PCS | Mod: S$GLB,,, | Performed by: EMERGENCY MEDICINE

## 2020-02-20 PROCEDURE — 99214 OFFICE O/P EST MOD 30 MIN: CPT | Mod: S$GLB,,, | Performed by: EMERGENCY MEDICINE

## 2020-02-20 RX ORDER — CLOTRIMAZOLE AND BETAMETHASONE DIPROPIONATE 10; .64 MG/G; MG/G
CREAM TOPICAL
COMMUNITY
Start: 2020-02-05 | End: 2021-03-26 | Stop reason: ALTCHOICE

## 2020-02-20 NOTE — PROGRESS NOTES
"Subjective:       Patient ID: Javier June is a 74 y.o. male.    Vitals:  height is 5' 9.5" (1.765 m) and weight is 78.5 kg (173 lb). His temperature is 97.8 °F (36.6 °C). His blood pressure is 104/72 and his pulse is 64. His respiration is 16 and oxygen saturation is 100%.     Chief Complaint: Wound Check    Pt just had surgery to the top of his left ear and would like for the provider to take a look at it to see if its healing good and change dressing     I saw patient many weeks ago for left upper ear lesion and referred him to Dermatology as was concern for basal cell or squamous cell carcinoma.  He saw his primary care doctor as well as Dermatology and biopsy showed squamous cell carcinoma.  He was set up for Mohs surgery and had this done yesterday morning and he was pleased with the procedure and results.  He is having difficulty with dressing changes and wanted to make sure that it was healing nicely.  No fevers no drainage of pus no wound dehiscence.    Wound Check   Prior ED Treatment: surgery. The maximum temperature noted was less than 100.4 F. The temperature was taken using an oral thermometer. There has been no drainage from the wound. The redness has improved. There is no swelling present. There is no pain present. He has no difficulty moving the affected extremity or digit.       Constitution: Negative for chills, fatigue and fever.   HENT: Negative for congestion and sore throat.    Neck: Negative for painful lymph nodes.   Cardiovascular: Negative for chest pain and leg swelling.   Eyes: Negative for double vision and blurred vision.   Respiratory: Negative for cough and shortness of breath.    Gastrointestinal: Negative for nausea, vomiting and diarrhea.   Genitourinary: Negative for dysuria, frequency and urgency.   Musculoskeletal: Negative for joint pain, joint swelling, muscle cramps and muscle ache.   Skin: Negative for color change, pale, rash and erythema.   Allergic/Immunologic: Negative " for seasonal allergies.   Neurological: Negative for dizziness, history of vertigo, light-headedness, passing out and headaches.   Hematologic/Lymphatic: Negative for swollen lymph nodes, easy bruising/bleeding and history of blood clots. Does not bruise/bleed easily.   Psychiatric/Behavioral: Negative for nervous/anxious, sleep disturbance and depression. The patient is not nervous/anxious.        Objective:      Physical Exam   Constitutional: He is oriented to person, place, and time. He appears well-developed and well-nourished.   HENT:   Head: Normocephalic and atraumatic. Head is without abrasion, without contusion and without laceration.   Right Ear: External ear normal.   Nose: Nose normal.   Mouth/Throat: Oropharynx is clear and moist and mucous membranes are normal.   2 cm post-surgical wound healing well, no signs of infection. Is at the superior aspect of the external ear. Able to place aquaphor and non stick dressing and medical tape successful at securing bandage. Explained to patient how to do this at home.   Eyes: Pupils are equal, round, and reactive to light. Conjunctivae, EOM and lids are normal.   Neck: Trachea normal, full passive range of motion without pain and phonation normal. Neck supple.   Cardiovascular: Normal rate, regular rhythm and normal heart sounds.   Pulmonary/Chest: Effort normal and breath sounds normal. No stridor. No respiratory distress.   Musculoskeletal: Normal range of motion.   Neurological: He is alert and oriented to person, place, and time.   Skin: Skin is warm, dry, intact and no rash. Capillary refill takes less than 2 seconds. abrasion, burn, bruising, erythema and ecchymosis  Psychiatric: He has a normal mood and affect. His speech is normal. Cognition and memory are normal.   Nursing note and vitals reviewed.        Assessment:       1. Encounter for postoperative wound check    2. Encounter for surgical wound dressing change        Plan:         Encounter for  postoperative wound check    Encounter for surgical wound dressing change          Patient Instructions   Continue following directions from Mohs surgeon.  Wound looks great without any signs of infection  In order to keep bandage secure, cut out 1-2 inch sq non adherent dressing and fold over the wound with the Aquaphor.  As we discussed, secure the dressing with tape on both the back and front side of the folded over dressing and then secure with the tape to the ear as well.  Follow up with her surgeon and return here for any concerns.      Bandage Change  If the bandage becomes wet or dirty, replace it. Otherwise, leave it in place for the first 24 hours. Then once a day:  · Remove the bandage and wash the area with soap and water. Use a wet cotton swab to loosen and remove any blood or crust that forms on the wound.  · After cleaning, apply a thin layer of antibiotic ointment or cream. Put the bandage back on.  You can shower as usual after the first 24 hours. If the bandage is on an arm or leg, cover it with a plastic bag rubber-banded at both ends before showering. Take care that the rubber bands are not too tight, cutting off circulation to the affected area.  Dont take a tub bath or go swimming until the bandage is removed and the wound healed. This will take at least 7 days.  When to seek medical advice  Call your healthcare provider right away if any of these occur with your wound:  · Fever  · Redness, warmth, or swelling  · Pain in the wound gets worse  · Pus drains when you remove the bandage  · Red streaks surround the wound area  Date Last Reviewed: 10/1/2016  © 1626-0057 [employer]. [address] [place] [address], [place] [address]. All rights reserved. This information is not intended as a substitute for professional medical care. Always follow your healthcare professional's instructions.

## 2020-02-20 NOTE — PATIENT INSTRUCTIONS
Continue following directions from Mohs surgeon.  Wound looks great without any signs of infection  In order to keep bandage secure, cut out 1-2 inch sq non adherent dressing and fold over the wound with the Aquaphor.  As we discussed, secure the dressing with tape on both the back and front side of the folded over dressing and then secure with the tape to the ear as well.  Follow up with her surgeon and return here for any concerns.      Bandage Change  If the bandage becomes wet or dirty, replace it. Otherwise, leave it in place for the first 24 hours. Then once a day:  · Remove the bandage and wash the area with soap and water. Use a wet cotton swab to loosen and remove any blood or crust that forms on the wound.  · After cleaning, apply a thin layer of antibiotic ointment or cream. Put the bandage back on.  You can shower as usual after the first 24 hours. If the bandage is on an arm or leg, cover it with a plastic bag rubber-banded at both ends before showering. Take care that the rubber bands are not too tight, cutting off circulation to the affected area.  Dont take a tub bath or go swimming until the bandage is removed and the wound healed. This will take at least 7 days.  When to seek medical advice  Call your healthcare provider right away if any of these occur with your wound:  · Fever  · Redness, warmth, or swelling  · Pain in the wound gets worse  · Pus drains when you remove the bandage  · Red streaks surround the wound area  Date Last Reviewed: 10/1/2016  © 7975-4307 [employer]. [address] [place] [address], [place] [address]. All rights reserved. This information is not intended as a substitute for professional medical care. Always follow your healthcare professional's instructions.

## 2020-02-27 ENCOUNTER — TELEPHONE (OUTPATIENT)
Dept: URGENT CARE | Facility: CLINIC | Age: 75
End: 2020-02-27

## 2020-03-12 ENCOUNTER — OFFICE VISIT (OUTPATIENT)
Dept: URGENT CARE | Facility: CLINIC | Age: 75
End: 2020-03-12
Payer: MEDICARE

## 2020-03-12 VITALS
BODY MASS INDEX: 24.62 KG/M2 | OXYGEN SATURATION: 98 % | HEART RATE: 100 BPM | SYSTOLIC BLOOD PRESSURE: 120 MMHG | TEMPERATURE: 97 F | WEIGHT: 172 LBS | RESPIRATION RATE: 16 BRPM | HEIGHT: 70 IN | DIASTOLIC BLOOD PRESSURE: 70 MMHG

## 2020-03-12 DIAGNOSIS — Z51.89 VISIT FOR WOUND CHECK: Primary | ICD-10-CM

## 2020-03-12 PROCEDURE — 99213 PR OFFICE/OUTPT VISIT, EST, LEVL III, 20-29 MIN: ICD-10-PCS | Mod: S$GLB,,, | Performed by: EMERGENCY MEDICINE

## 2020-03-12 PROCEDURE — 99213 OFFICE O/P EST LOW 20 MIN: CPT | Mod: S$GLB,,, | Performed by: EMERGENCY MEDICINE

## 2020-03-12 NOTE — PROGRESS NOTES
"Subjective:       Patient ID: Javier June is a 74 y.o. male.    Vitals:  height is 5' 9.5" (1.765 m) and weight is 78 kg (172 lb). His temperature is 96.7 °F (35.9 °C). His blood pressure is 120/70 and his pulse is 100. His respiration is 16 and oxygen saturation is 98%.     Chief Complaint: Ear Problem    Patient had surgery on his left ear on 2/27/2020. Patient is having a problem keeping his bandage on his ear and needs proper instruction on application.     Otalgia    There is pain in the left ear. This is a recurrent problem. The current episode started 1 to 4 weeks ago. The problem occurs constantly. The problem has been unchanged. There has been no fever. The pain is at a severity of 0/10. The patient is experiencing no pain. Pertinent negatives include no coughing, diarrhea, headaches, rash, sore throat or vomiting. He has tried nothing for the symptoms.       Constitution: Negative for chills, fatigue and fever.   HENT: Positive for ear pain. Negative for congestion and sore throat.    Neck: Negative for painful lymph nodes.   Cardiovascular: Negative for chest pain and leg swelling.   Eyes: Negative for double vision and blurred vision.   Respiratory: Negative for cough and shortness of breath.    Gastrointestinal: Negative for nausea, vomiting and diarrhea.   Genitourinary: Negative for dysuria, frequency and urgency.   Musculoskeletal: Negative for joint pain, joint swelling, muscle cramps and muscle ache.   Skin: Negative for color change, pale, rash and erythema.   Allergic/Immunologic: Negative for seasonal allergies.   Neurological: Negative for dizziness, history of vertigo, light-headedness, passing out and headaches.   Hematologic/Lymphatic: Negative for swollen lymph nodes, easy bruising/bleeding and history of blood clots. Does not bruise/bleed easily.   Psychiatric/Behavioral: Negative for nervous/anxious, sleep disturbance and depression. The patient is not nervous/anxious.        Objective: "      Physical Exam   Constitutional: He is oriented to person, place, and time. He appears well-developed and well-nourished.   HENT:   Head: Normocephalic and atraumatic. Head is without abrasion, without contusion and without laceration.   Mouth/Throat: Mucous membranes are normal.   Eyes: Pupils are equal, round, and reactive to light. Conjunctivae, EOM and lids are normal.   Neck: Trachea normal, full passive range of motion without pain and phonation normal. Neck supple.   Cardiovascular: Normal rate, regular rhythm and normal heart sounds.   Pulmonary/Chest: Effort normal and breath sounds normal. No stridor. No respiratory distress.   Musculoskeletal: Normal range of motion.   Neurological: He is alert and oriented to person, place, and time.   Skin: Skin is warm, dry, intact and no rash. Capillary refill takes less than 2 seconds.   Healing wound to the left superior aspect of the wound. No drainage, no bleeding, no cellulitis, nearly completely healed. abrasion, burn, bruising, erythema and ecchymosis  Psychiatric: He has a normal mood and affect. His speech is normal. Cognition and memory are normal.   Nursing note and vitals reviewed.      has appointment with derm on 19th  Assessment:       1. Visit for wound check        Plan:         Visit for wound check

## 2020-07-21 ENCOUNTER — OFFICE VISIT (OUTPATIENT)
Dept: URGENT CARE | Facility: CLINIC | Age: 75
End: 2020-07-21
Payer: MEDICARE

## 2020-07-21 VITALS
TEMPERATURE: 99 F | HEART RATE: 86 BPM | DIASTOLIC BLOOD PRESSURE: 76 MMHG | OXYGEN SATURATION: 97 % | SYSTOLIC BLOOD PRESSURE: 144 MMHG

## 2020-07-21 DIAGNOSIS — S69.91XA INJURY OF RIGHT HAND, INITIAL ENCOUNTER: Primary | ICD-10-CM

## 2020-07-21 PROCEDURE — 99214 PR OFFICE/OUTPT VISIT, EST, LEVL IV, 30-39 MIN: ICD-10-PCS | Mod: S$GLB,,, | Performed by: NURSE PRACTITIONER

## 2020-07-21 PROCEDURE — 99214 OFFICE O/P EST MOD 30 MIN: CPT | Mod: S$GLB,,, | Performed by: NURSE PRACTITIONER

## 2020-07-21 PROCEDURE — 73130 X-RAY EXAM OF HAND: CPT | Mod: FY,RT,S$GLB, | Performed by: RADIOLOGY

## 2020-07-21 PROCEDURE — 73130 XR HAND COMPLETE 3 VIEW RIGHT: ICD-10-PCS | Mod: FY,RT,S$GLB, | Performed by: RADIOLOGY

## 2020-07-21 NOTE — PATIENT INSTRUCTIONS
If you were prescribed a narcotic or controlled medication, do not drive or operate heavy equipment or machinery while taking these medications.  You must understand that you've received an Urgent Care treatment only and that you may be released before all your medical problems are known or treated. You, the patient, will arrange for follow up care as instructed.  Follow up with your PCP or specialty clinic as directed within 2-5 days if not improved or as needed.  You can call [phone] to schedule an appointment with the appropriate provider.  If your condition worsens we recommend that you receive another evaluation at the emergency room immediately or contact your primary medical clinics after hours call service to discuss your concerns.  Please return here or go to the Emergency Department for any concerns or worsening of condition.      Hand Sprain  A sprain is a stretching or tearing of the ligaments that hold a joint together. There are no broken bones. Sprains take 3 to 6 weeks to heal. A sprained hand may be treated with a splint or elastic wrap for support.  Home care  · Keep your arm elevated to reduce pain and swelling. This is most important during the first 48 hours.  · Apply an ice pack over the injured area for 15 to 20 minutes every 3 to 6 hours. You should do this for the first 24 to 48 hours. You can make an ice pack by filling a plastic bag that seals at the top with ice cubes and then wrapping it with a thin towel. Continue the use of ice packs for relief of pain and swelling as needed. As the ice melts, be careful to avoid getting any wrap or splint wet. After 48 hours, apply heat (warm shower or warm bath) for 15 to 20 minutes several times a day, or alternate ice and heat.  · You may use over-the-counter pain medicine to control pain, unless another pain medicine was prescribed. If you have chronic liver or kidney disease or ever had a stomach ulcer or GI bleeding, talk with your  healthcare provider before using these medicines.  · If you were given a splint or elastic wrap, wear it until your pain improves.  Follow-up care  Follow up with your healthcare provider as advised. Sometimes fractures dont show up on the first X-ray. Bruises and sprains can sometimes hurt as much as a fracture. These injuries can take time to heal completely. If your symptoms dont improve or they get worse, talk with your healthcare provider. You may need a repeat X-ray.  When to seek medical advice  Call your healthcare provider right away if any of these occur:  · Pain or swelling increases  · Fingers or hand becomes cold, blue, numb, or tingly  Date Last Reviewed: 11/20/2015  © 4320-0678 [employer]. [address] [place] [address], [place] [address]. All rights reserved. This information is not intended as a substitute for professional medical care. Always follow your healthcare professional's instructions.

## 2020-07-21 NOTE — PROGRESS NOTES
Subjective:       Patient ID: Javier June is a 74 y.o. male.    Vitals:  temperature is 98.7 °F (37.1 °C). His blood pressure is 144/76 (abnormal) and his pulse is 86. His oxygen saturation is 97%.     Chief Complaint: Hand Pain    74 yr old male presents to the Urgent Care with complaint of right hand pain s/p fall x 3 days. Patient was walking down the stairs and missed the last step. Patient tried to catch fall and injured right hand. Patient reports pain with opening bottles and concerned for possible fracture.     Hand Pain   The incident occurred 2 days ago. The incident occurred at home. The injury mechanism was a fall. The pain is present in the right hand. The quality of the pain is described as aching. The pain does not radiate. The pain is at a severity of 4/10. The pain is mild. The pain has been fluctuating since the incident. Pertinent negatives include no chest pain. The symptoms are aggravated by movement and palpation. He has tried NSAIDs for the symptoms. The treatment provided mild relief.       Constitution: Negative for chills, fatigue and fever.   HENT: Negative for congestion and sore throat.    Neck: Negative for painful lymph nodes.   Cardiovascular: Negative for chest pain and leg swelling.   Eyes: Negative for double vision and blurred vision.   Respiratory: Negative for cough and shortness of breath.    Gastrointestinal: Negative for nausea, vomiting and diarrhea.   Genitourinary: Negative for dysuria, frequency and urgency.   Musculoskeletal: Positive for pain, joint pain and joint swelling. Negative for muscle cramps and muscle ache.   Skin: Positive for bruising. Negative for pale and rash.   Allergic/Immunologic: Negative for seasonal allergies.   Neurological: Negative for dizziness, history of vertigo, light-headedness, passing out and headaches.   Hematologic/Lymphatic: Negative for swollen lymph nodes, easy bruising/bleeding and history of blood clots. Does not bruise/bleed  easily.   Psychiatric/Behavioral: Negative for nervous/anxious, sleep disturbance and depression. The patient is not nervous/anxious.        Objective:      Physical Exam   Constitutional: He is oriented to person, place, and time. He appears well-developed. He is cooperative.  Non-toxic appearance. He does not appear ill. No distress.   HENT:   Head: Normocephalic and atraumatic.   Nose: Nose abnormal.   Mouth/Throat: Oropharynx is clear and moist and mucous membranes are normal.   Eyes: Conjunctivae and lids are normal.   Neck: Trachea normal, normal range of motion, full passive range of motion without pain and phonation normal. Neck supple.   Cardiovascular: Normal rate and normal pulses.   Pulses:       Radial pulses are 2+ on the right side and 2+ on the left side.   Pulmonary/Chest: Effort normal.   Abdominal: Normal appearance.   Musculoskeletal:      Right hand: He exhibits swelling. He exhibits normal range of motion, no tenderness, no bony tenderness, normal two-point discrimination, normal capillary refill, no deformity and no laceration. Normal sensation noted. Decreased sensation is not present in the ulnar distribution, is not present in the medial distribution and is not present in the radial distribution. Normal strength noted. He exhibits no finger abduction, no thumb/finger opposition and no wrist extension trouble.        Hands:    Neurological: He is alert and oriented to person, place, and time. He has normal strength and normal reflexes. No sensory deficit.   Skin: Skin is warm, dry, intact and not diaphoretic. Capillary refill takes less than 2 seconds. Psychiatric: His speech is normal and behavior is normal. Judgment and thought content normal.   Nursing note and vitals reviewed.        Assessment:       1. 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     Impression:     No fracture  dislocation.  Plan:       Patient refused hand brace or thumb spica. Advised patient to f/u with PCP for further evaluation. Patient aware of DJD to right hand.     Injury of right hand, initial encounter  -     XR HAND COMPLETE 3 VIEW RIGHT; Future; Expected date: 07/21/2020      Patient Instructions   If you were prescribed a narcotic or controlled medication, do not drive or operate heavy equipment or machinery while taking these medications.  You must understand that you've received an Urgent Care treatment only and that you may be released before all your medical problems are known or treated. You, the patient, will arrange for follow up care as instructed.  Follow up with your PCP or specialty clinic as directed within 2-5 days if not improved or as needed.  You can call [phone] to schedule an appointment with the appropriate provider.  If your condition worsens we recommend that you receive another evaluation at the emergency room immediately or contact your primary medical clinics after hours call service to discuss your concerns.  Please return here or go to the Emergency Department for any concerns or worsening of condition.      Hand Sprain  A sprain is a stretching or tearing of the ligaments that hold a joint together. There are no broken bones. Sprains take 3 to 6 weeks to heal. A sprained hand may be treated with a splint or elastic wrap for support.  Home care  · Keep your arm elevated to reduce pain and swelling. This is most important during the first 48 hours.  · Apply an ice pack over the injured area for 15 to 20 minutes every 3 to 6 hours. You should do this for the first 24 to 48 hours. You can make an ice pack by filling a plastic bag that seals at the top with ice cubes and then wrapping it with a thin towel. Continue the use of ice packs for relief of pain and swelling as needed. As the ice melts, be careful to avoid getting any wrap or splint wet. After 48 hours, apply heat (warm  shower or warm bath) for 15 to 20 minutes several times a day, or alternate ice and heat.  · You may use over-the-counter pain medicine to control pain, unless another pain medicine was prescribed. If you have chronic liver or kidney disease or ever had a stomach ulcer or GI bleeding, talk with your healthcare provider before using these medicines.  · If you were given a splint or elastic wrap, wear it until your pain improves.  Follow-up care  Follow up with your healthcare provider as advised. Sometimes fractures dont show up on the first X-ray. Bruises and sprains can sometimes hurt as much as a fracture. These injuries can take time to heal completely. If your symptoms dont improve or they get worse, talk with your healthcare provider. You may need a repeat X-ray.  When to seek medical advice  Call your healthcare provider right away if any of these occur:  · Pain or swelling increases  · Fingers or hand becomes cold, blue, numb, or tingly  Date Last Reviewed: 11/20/2015  © 9617-1087 [employer]. [address] [place] [address], [place] [address]. All rights reserved. This information is not intended as a substitute for professional medical care. Always follow your healthcare professional's instructions.

## 2021-03-26 ENCOUNTER — OFFICE VISIT (OUTPATIENT)
Dept: URGENT CARE | Facility: CLINIC | Age: 76
End: 2021-03-26
Payer: MEDICARE

## 2021-03-26 VITALS
DIASTOLIC BLOOD PRESSURE: 81 MMHG | OXYGEN SATURATION: 97 % | RESPIRATION RATE: 17 BRPM | HEART RATE: 99 BPM | TEMPERATURE: 98 F | SYSTOLIC BLOOD PRESSURE: 157 MMHG

## 2021-03-26 DIAGNOSIS — M54.2 NECK PAIN: ICD-10-CM
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
## 2021-03-26 DIAGNOSIS — S13.9XXA ACUTE SPRAIN OF LIGAMENT OF NECK, INITIAL ENCOUNTER: ICD-10-CM

## 2021-03-26 PROCEDURE — 72040 XR CERVICAL SPINE 2 OR 3 VIEWS: ICD-10-PCS | Mod: FY,S$GLB,, | Performed by: RADIOLOGY

## 2021-03-26 PROCEDURE — 99213 PR OFFICE/OUTPT VISIT, EST, LEVL III, 20-29 MIN: ICD-10-PCS | Mod: S$GLB,,, | Performed by: NURSE PRACTITIONER

## 2021-03-26 PROCEDURE — 99213 OFFICE O/P EST LOW 20 MIN: CPT | Mod: S$GLB,,, | Performed by: NURSE PRACTITIONER

## 2021-03-26 PROCEDURE — 72040 X-RAY EXAM NECK SPINE 2-3 VW: CPT | Mod: FY,S$GLB,, | Performed by: RADIOLOGY

## 2021-03-26 RX ORDER — CYCLOBENZAPRINE HCL 5 MG
5 TABLET ORAL NIGHTLY
Qty: 7 TABLET | Refills: 0 | Status: SHIPPED | OUTPATIENT
Start: 2021-03-26 | End: 2021-04-02

## 2021-03-29 ENCOUNTER — TELEPHONE (OUTPATIENT)
Dept: URGENT CARE | Facility: CLINIC | Age: 76
End: 2021-03-29